# Patient Record
Sex: FEMALE | Race: OTHER | URBAN - METROPOLITAN AREA
[De-identification: names, ages, dates, MRNs, and addresses within clinical notes are randomized per-mention and may not be internally consistent; named-entity substitution may affect disease eponyms.]

---

## 2024-01-02 ENCOUNTER — OFFICE VISIT (OUTPATIENT)
Age: 29
End: 2024-01-02

## 2024-01-02 ENCOUNTER — HOSPITAL ENCOUNTER (OUTPATIENT)
Facility: HOSPITAL | Age: 29
Setting detail: SPECIMEN
Discharge: HOME | End: 2024-01-05

## 2024-01-02 VITALS
SYSTOLIC BLOOD PRESSURE: 106 MMHG | DIASTOLIC BLOOD PRESSURE: 77 MMHG | HEIGHT: 61 IN | TEMPERATURE: 97.7 F | OXYGEN SATURATION: 100 % | HEART RATE: 68 BPM | WEIGHT: 210.1 LBS | BODY MASS INDEX: 39.67 KG/M2

## 2024-01-02 DIAGNOSIS — K21.9 GASTROESOPHAGEAL REFLUX DISEASE WITHOUT ESOPHAGITIS: ICD-10-CM

## 2024-01-02 DIAGNOSIS — R10.11 RUQ PAIN: Primary | ICD-10-CM

## 2024-01-02 PROCEDURE — 99203 OFFICE O/P NEW LOW 30 MIN: CPT | Performed by: FAMILY MEDICINE

## 2024-01-02 PROCEDURE — 87338 HPYLORI STOOL AG IA: CPT

## 2024-01-02 RX ORDER — ONDANSETRON 4 MG/1
4 TABLET, FILM COATED ORAL EVERY 8 HOURS PRN
COMMUNITY

## 2024-01-02 RX ORDER — PANTOPRAZOLE SODIUM 40 MG/1
40 TABLET, DELAYED RELEASE ORAL
Qty: 30 TABLET | Refills: 0 | Status: SHIPPED | OUTPATIENT
Start: 2024-01-02

## 2024-01-02 RX ORDER — DICYCLOMINE HYDROCHLORIDE 10 MG/1
10 CAPSULE ORAL
COMMUNITY

## 2024-01-02 SDOH — ECONOMIC STABILITY: HOUSING INSECURITY
IN THE LAST 12 MONTHS, WAS THERE A TIME WHEN YOU DID NOT HAVE A STEADY PLACE TO SLEEP OR SLEPT IN A SHELTER (INCLUDING NOW)?: YES

## 2024-01-02 SDOH — ECONOMIC STABILITY: INCOME INSECURITY: HOW HARD IS IT FOR YOU TO PAY FOR THE VERY BASICS LIKE FOOD, HOUSING, MEDICAL CARE, AND HEATING?: SOMEWHAT HARD

## 2024-01-02 SDOH — ECONOMIC STABILITY: HOUSING INSECURITY
IN THE LAST 12 MONTHS, WAS THERE A TIME WHEN YOU DID NOT HAVE A STEADY PLACE TO SLEEP OR SLEPT IN A SHELTER (INCLUDING NOW)?: NO

## 2024-01-02 SDOH — ECONOMIC STABILITY: HOUSING INSECURITY: IN THE LAST 12 MONTHS, HOW MANY PLACES HAVE YOU LIVED?: 3

## 2024-01-02 SDOH — ECONOMIC STABILITY: INCOME INSECURITY: IN THE LAST 12 MONTHS, WAS THERE A TIME WHEN YOU WERE NOT ABLE TO PAY THE MORTGAGE OR RENT ON TIME?: YES

## 2024-01-02 SDOH — ECONOMIC STABILITY: FOOD INSECURITY: WITHIN THE PAST 12 MONTHS, THE FOOD YOU BOUGHT JUST DIDN'T LAST AND YOU DIDN'T HAVE MONEY TO GET MORE.: OFTEN TRUE

## 2024-01-02 SDOH — ECONOMIC STABILITY: FOOD INSECURITY: WITHIN THE PAST 12 MONTHS, YOU WORRIED THAT YOUR FOOD WOULD RUN OUT BEFORE YOU GOT MONEY TO BUY MORE.: OFTEN TRUE

## 2024-01-02 SDOH — ECONOMIC STABILITY: TRANSPORTATION INSECURITY
IN THE PAST 12 MONTHS, HAS THE LACK OF TRANSPORTATION KEPT YOU FROM MEDICAL APPOINTMENTS OR FROM GETTING MEDICATIONS?: YES

## 2024-01-02 SDOH — ECONOMIC STABILITY: TRANSPORTATION INSECURITY
IN THE PAST 12 MONTHS, HAS LACK OF TRANSPORTATION KEPT YOU FROM MEETINGS, WORK, OR FROM GETTING THINGS NEEDED FOR DAILY LIVING?: YES

## 2024-01-02 ASSESSMENT — PATIENT HEALTH QUESTIONNAIRE - PHQ9
1. LITTLE INTEREST OR PLEASURE IN DOING THINGS: 0
SUM OF ALL RESPONSES TO PHQ QUESTIONS 1-9: 0
2. FEELING DOWN, DEPRESSED OR HOPELESS: 0
SUM OF ALL RESPONSES TO PHQ9 QUESTIONS 1 & 2: 0
SUM OF ALL RESPONSES TO PHQ QUESTIONS 1-9: 0

## 2024-01-02 ASSESSMENT — ENCOUNTER SYMPTOMS
ABDOMINAL PAIN: 1
DIARRHEA: 1
SHORTNESS OF BREATH: 0
NAUSEA: 1
ABDOMINAL DISTENTION: 1
COUGH: 0
CONSTIPATION: 0

## 2024-01-02 ASSESSMENT — SOCIAL DETERMINANTS OF HEALTH (SDOH)
WITHIN THE LAST YEAR, HAVE YOU BEEN HUMILIATED OR EMOTIONALLY ABUSED IN OTHER WAYS BY YOUR PARTNER OR EX-PARTNER?: NO
WITHIN THE LAST YEAR, HAVE YOU BEEN AFRAID OF YOUR PARTNER OR EX-PARTNER?: NO
WITHIN THE LAST YEAR, HAVE YOU BEEN KICKED, HIT, SLAPPED, OR OTHERWISE PHYSICALLY HURT BY YOUR PARTNER OR EX-PARTNER?: NO
HOW HARD IS IT FOR YOU TO PAY FOR THE VERY BASICS LIKE FOOD, HOUSING, MEDICAL CARE, AND HEATING?: VERY HARD
WITHIN THE LAST YEAR, HAVE TO BEEN RAPED OR FORCED TO HAVE ANY KIND OF SEXUAL ACTIVITY BY YOUR PARTNER OR EX-PARTNER?: NO

## 2024-01-02 NOTE — PROGRESS NOTES
Clarissa Fernandez is a 28 y.o. female   Chief Complaint   Patient presents with    Abdominal Pain     Pt is here for abdominal pain mostly on right side for the last 2 or 3 weeks. ER visit 12/26/23 [facility].          ASSESSMENT AND PLAN:    1. RUQ pain  Gallstones can be missed on CT, will check US.  - US ABDOMEN LIMITED; Future    2. Gastroesophageal reflux disease without esophagitis  I suspect she does have some GERD/gastritis. Will start a PPI after she's given a stool sample for Hpylori.  Follow up after imaging.  - pantoprazole (PROTONIX) 40 MG tablet; Take 1 tablet by mouth every morning (before breakfast)  Dispense: 30 tablet; Refill: 0  - H. Pylori Antigen, Stool; Future        SUBJECTIVE:    HPI:  Clarissa Fernandez is a 28 y.o. female who presents for abdominal pain followup.  Went to  ED one week ago because she was having RUQ pain radiating to her right side.  5 days prior she was having generalized abdominal pain and diarrhea.  For a while, she has felt like she had trouble digesting foods. She will have reflux that lasts >24 hours. She gets a bitter taste in her mouth.  She was having pain, but went to [employer]. At [employer] she fainted, so they took her to the ED.  She had a CT. She was told to see a specialist because she had gastritis.  She has multiple family members who had been told they had gastritis but had cholecystitis/cholelithiasis, so she is skeptical of the diagnosis.  Bentyl and zofran have helped the pain, but she feels dizzy all day.   She has been nervous about eating and only eating once a day - mostly soups and veggies.  Pain is worse after eating.    She used to have mild RUQ pain and occasional diarrhea or cramps, but she was also having chest pressure and chills when she went to the ED. The pain was different.    Review of Systems   Constitutional:  Negative for fatigue, fever and unexpected weight change.   Eyes:  Negative for visual

## 2024-01-03 NOTE — PROGRESS NOTES
[facility]:  [id number].  Talita Carrion LPN    Patient name and date of birth verified by .  Patient given an after visit summary, reviewed medication on how and when to take, coupon given to present to pharmacy for medication discount. Patient was given equipment and instructions on how to collect stool specimen for H. Pylori stool.  Clarissa was given instructions to bring specimen to [facility] as a lab drop off or if she completed the day of visit  could come back to [facility] to drop off specimen.  Patient was given instructions not to start the medication until she submitted the stool specimen.  Patient was able verbalized understanding of the process.   Advised that follow up appointment will be pending upon completion of ultrasound.  Patient verbalized understanding of all information given at time of visit. Talita Carrion LPN    Patient signed a release of information form for emergency room visit at [facility] December 26, 2023.  Request of records will be faxed  faxed.  ADELE Bonilla interpreter [place]:  [id number].  Talita Carrion LPN    Patient was given a call at [phone] and a voice message was left of appointment date, time and location of ultrasound.  Patient is scheduled at [facility] on January 15, 2024 at 3:45 pm with arrival at 3:00 pm.  Patient advised not to eat 6 hours prior to test and only have sips of water.  Imaging center list with address indicated for test and letter with appointment date and time with test instructions.  Talita Carrion LPN

## 2024-01-05 LAB
H PYLORI AG STL QL IA: NEGATIVE
SPECIMEN SOURCE: NORMAL

## 2024-01-15 ENCOUNTER — HOSPITAL ENCOUNTER (OUTPATIENT)
Facility: HOSPITAL | Age: 29
Discharge: HOME | End: 2024-01-18
Attending: FAMILY MEDICINE

## 2024-01-15 DIAGNOSIS — R10.11 RUQ PAIN: ICD-10-CM

## 2024-01-15 PROCEDURE — 76705 ECHO EXAM OF ABDOMEN: CPT

## 2024-01-18 NOTE — RESULT ENCOUNTER NOTE
Please schedule patient for a follow up appointment to review their results.    -----  US:  No gallstones or GB abnormalities.  Possible fatty liver  (normal LFTs and normal CT at )

## 2024-01-30 ENCOUNTER — TELEPHONE (OUTPATIENT)
Age: 29
End: 2024-01-30

## 2024-01-30 NOTE — TELEPHONE ENCOUNTER
OW called patient 2x and was unable to speak with patient. Left a voice message explaining the reason of the call and asking to call OW back.

## 2024-02-02 ENCOUNTER — OFFICE VISIT (OUTPATIENT)
Age: 29
End: 2024-02-02

## 2024-02-02 DIAGNOSIS — Z71.89 COUNSELING AND COORDINATION OF CARE: Primary | ICD-10-CM

## 2024-02-02 NOTE — PROGRESS NOTES
Assisted patient with medical bills. FA was filled out. Patient took it with her. Pending POI and bank statements. Patient will call [facility] when she has pending documents. Patient was instructed to complete Medicaid screening.   Food resources were given to patient, along with food distribution event for tomorrow 2/3/24.

## 2024-02-16 ENCOUNTER — OFFICE VISIT (OUTPATIENT)
Age: 29
End: 2024-02-16

## 2024-02-16 DIAGNOSIS — Z71.89 COUNSELING AND COORDINATION OF CARE: Primary | ICD-10-CM

## 2024-02-16 NOTE — PROGRESS NOTES
Patient's [facility] FA is incomplete. Pending 's POI. A new appointment was made for 2/29/24. Patient was instructed to complete Medicaid screening. Patient has FA application.

## 2024-02-27 ENCOUNTER — TELEPHONE (OUTPATIENT)
Age: 29
End: 2024-02-27

## 2024-03-01 ENCOUNTER — OFFICE VISIT (OUTPATIENT)
Age: 29
End: 2024-03-01

## 2024-03-01 DIAGNOSIS — Z71.89 COUNSELING AND COORDINATION OF CARE: Primary | ICD-10-CM

## 2024-03-01 NOTE — PROGRESS NOTES
Patient has been approved for [employer] insurance. CHW explained that she can no longer be seen at the [facility] and the debt that patient had was written-off after her [employer] health insurance was approved.   Patient still has a pending Radiology bill and CHW provided phone number where patient needs to call and let them know that she has health insurance, so her debt can be written off.  Patient verbalized understanding.

## 2025-07-15 ENCOUNTER — TELEMEDICINE (OUTPATIENT)
Age: 30
End: 2025-07-15
Payer: COMMERCIAL

## 2025-07-15 DIAGNOSIS — R53.83 OTHER FATIGUE: ICD-10-CM

## 2025-07-15 DIAGNOSIS — K59.00 CONSTIPATION, UNSPECIFIED CONSTIPATION TYPE: Primary | ICD-10-CM

## 2025-07-15 PROCEDURE — 99213 OFFICE O/P EST LOW 20 MIN: CPT | Performed by: FAMILY MEDICINE

## 2025-07-15 RX ORDER — POLYETHYLENE GLYCOL 3350 17 G/17G
17 POWDER, FOR SOLUTION ORAL DAILY
Qty: 510 G | Refills: 0 | Status: SHIPPED | OUTPATIENT
Start: 2025-07-15

## 2025-07-15 SDOH — ECONOMIC STABILITY: FOOD INSECURITY: WITHIN THE PAST 12 MONTHS, YOU WORRIED THAT YOUR FOOD WOULD RUN OUT BEFORE YOU GOT MONEY TO BUY MORE.: SOMETIMES TRUE

## 2025-07-15 SDOH — ECONOMIC STABILITY: FOOD INSECURITY: WITHIN THE PAST 12 MONTHS, THE FOOD YOU BOUGHT JUST DIDN'T LAST AND YOU DIDN'T HAVE MONEY TO GET MORE.: NEVER TRUE

## 2025-07-15 ASSESSMENT — PATIENT HEALTH QUESTIONNAIRE - PHQ9
SUM OF ALL RESPONSES TO PHQ QUESTIONS 1-9: 0
SUM OF ALL RESPONSES TO PHQ QUESTIONS 1-9: 0
2. FEELING DOWN, DEPRESSED OR HOPELESS: NOT AT ALL
1. LITTLE INTEREST OR PLEASURE IN DOING THINGS: NOT AT ALL
SUM OF ALL RESPONSES TO PHQ QUESTIONS 1-9: 0
SUM OF ALL RESPONSES TO PHQ QUESTIONS 1-9: 0

## 2025-07-15 NOTE — PROGRESS NOTES
Discharge call made to the pt. The pt verified her name and . Session code- 30783. The medication ordered was reviewed with the pt. The pt confirmed coupon texted to her phone from [employer]. The pt had not been called to schedule her appt the pt was scheduled for tomorrow. Gayla Livingston RN

## 2025-07-15 NOTE — PROGRESS NOTES
Intake call to the pt. Session code- 52027. The pt verified their name and .    Pain in left side of abdomen like a knot or something in there when bending over.     Coordination of Care  1. Have you been to the ER, urgent care clinic since your last visit?  Hospitalized since your last visit? No    2. Have you seen or consulted any other health care providers outside of the [facility] since your last visit?  Include any pap smears or colon screening. No  Does the patient need refills?No    Learning Assessment Complete? no  Depression Screening complete in the past 12 months? yes

## 2025-07-15 NOTE — PROGRESS NOTES
Clarissa Fernandez (: 1995) is a 30 y.o. female, Established patient, Virtual Visit for evaluation of the following chief complaint(s):   Abdominal Pain (Started 3 weeks ago got very bad. Very constipated. Having problems evacuating the stool, and sometimes has noticed blood. ), Fatigue (Got fired from job due to fatigue. ), and Dizziness (Pain behind her left ear. Started 2 weeks ago. )       ASSESSMENT/PLAN:  1. Constipation, unspecified constipation type  Start Miralax and will have her follow up for check up and TSH  2. Other fatigue  Follow up for exam and TSH.    With missed menses will need to check pregnancy test.  Return for follow up F2F next available for constipation any provider (acute or chronic care OK).    SUBJECTIVE/OBJECTIVE:  VV for acute visit.  Had insurance last year but was not able to get anything covered and so it was canceled.  Abdominal Pain:  3 weeks ago started with LLQ pain with worsening constipation.  Has small BM until she is able to have large BM but continues with abdominal pain.  Has tried modifying her diet and drinking more water.  Has gone up to 5 days without any BM.  After having BM develops Lt sided headaches behind Lt ear for about an hour.  1 week of Lt knot at Lt rib, only when lifting something heavy.  Fatigue: 3 weeks of fatigue, sleeps 8 hours but wakes tired.    PMHx  [facility] 2023: available records reviewed. abdominal pain: CT NL, Labs mildly elevated WBC and BG otherwise unrevealing (no TSH done).    Review of Systems   Regular but LMP 2025.    Patient-Reported Vitals  No data recorded     Physical Exam    Clarissa Fernandez, was evaluated through a synchronous (real-time) audio encounter. The patient (or guardian if applicable) is aware that this is a billable service, which includes applicable co-pays. This Virtual Visit was conducted with patient's (and/or legal guardian's) consent. Patient identification was verified, and a

## 2025-07-16 ENCOUNTER — HOSPITAL ENCOUNTER (OUTPATIENT)
Facility: HOSPITAL | Age: 30
Setting detail: SPECIMEN
Discharge: HOME | End: 2025-07-19

## 2025-07-16 ENCOUNTER — OFFICE VISIT (OUTPATIENT)
Age: 30
End: 2025-07-16

## 2025-07-16 VITALS
TEMPERATURE: 98.4 F | SYSTOLIC BLOOD PRESSURE: 109 MMHG | WEIGHT: 203 LBS | DIASTOLIC BLOOD PRESSURE: 72 MMHG | HEIGHT: 61 IN | OXYGEN SATURATION: 97 % | BODY MASS INDEX: 38.33 KG/M2 | HEART RATE: 75 BPM

## 2025-07-16 DIAGNOSIS — R53.83 OTHER FATIGUE: ICD-10-CM

## 2025-07-16 DIAGNOSIS — R10.31 ABDOMINAL PAIN, RLQ: ICD-10-CM

## 2025-07-16 DIAGNOSIS — F45.8 TEETH GRINDING: ICD-10-CM

## 2025-07-16 DIAGNOSIS — R53.83 OTHER FATIGUE: Primary | ICD-10-CM

## 2025-07-16 LAB
BASOPHILS # BLD: 0.02 K/UL (ref 0–0.1)
BASOPHILS NFR BLD: 0.2 % (ref 0–1)
DIFFERENTIAL METHOD BLD: NORMAL
EOSINOPHIL # BLD: 0 K/UL (ref 0–0.4)
EOSINOPHIL NFR BLD: 0 % (ref 0–7)
ERYTHROCYTE [DISTWIDTH] IN BLOOD BY AUTOMATED COUNT: 12.7 % (ref 11.5–14.5)
HCG, PREGNANCY, URINE, POC: NEGATIVE
HCT VFR BLD AUTO: 42.2 % (ref 35–47)
HEMOGLOBIN, POC: 13.5 G/DL
HGB BLD-MCNC: 13.2 G/DL (ref 11.5–16)
IMM GRANULOCYTES # BLD AUTO: 0.03 K/UL (ref 0–0.04)
IMM GRANULOCYTES NFR BLD AUTO: 0.3 % (ref 0–0.5)
LYMPHOCYTES # BLD: 2.52 K/UL (ref 0.8–3.5)
LYMPHOCYTES NFR BLD: 28.6 % (ref 12–49)
MCH RBC QN AUTO: 27.6 PG (ref 26–34)
MCHC RBC AUTO-ENTMCNC: 31.3 G/DL (ref 30–36.5)
MCV RBC AUTO: 88.3 FL (ref 80–99)
MONOCYTES # BLD: 0.49 K/UL (ref 0–1)
MONOCYTES NFR BLD: 5.6 % (ref 5–13)
NEUTS SEG # BLD: 5.76 K/UL (ref 1.8–8)
NEUTS SEG NFR BLD: 65.3 % (ref 32–75)
NRBC # BLD: 0 K/UL (ref 0–0.01)
NRBC BLD-RTO: 0 PER 100 WBC
PLATELET # BLD AUTO: 239 K/UL (ref 150–400)
PMV BLD AUTO: 11.6 FL (ref 8.9–12.9)
RBC # BLD AUTO: 4.78 M/UL (ref 3.8–5.2)
VALID INTERNAL CONTROL, POC: PRESENT
WBC # BLD AUTO: 8.8 K/UL (ref 3.6–11)

## 2025-07-16 PROCEDURE — 81025 URINE PREGNANCY TEST: CPT | Performed by: FAMILY MEDICINE

## 2025-07-16 PROCEDURE — 85025 COMPLETE CBC W/AUTO DIFF WBC: CPT

## 2025-07-16 PROCEDURE — 80053 COMPREHEN METABOLIC PANEL: CPT

## 2025-07-16 PROCEDURE — 85018 HEMOGLOBIN: CPT | Performed by: FAMILY MEDICINE

## 2025-07-16 PROCEDURE — 84443 ASSAY THYROID STIM HORMONE: CPT

## 2025-07-16 PROCEDURE — 84439 ASSAY OF FREE THYROXINE: CPT

## 2025-07-16 PROCEDURE — 99214 OFFICE O/P EST MOD 30 MIN: CPT | Performed by: FAMILY MEDICINE

## 2025-07-16 ASSESSMENT — PATIENT HEALTH QUESTIONNAIRE - PHQ9
SUM OF ALL RESPONSES TO PHQ QUESTIONS 1-9: 1
2. FEELING DOWN, DEPRESSED OR HOPELESS: SEVERAL DAYS
1. LITTLE INTEREST OR PLEASURE IN DOING THINGS: NOT AT ALL
SUM OF ALL RESPONSES TO PHQ QUESTIONS 1-9: 1

## 2025-07-16 NOTE — PROGRESS NOTES
Clarissa Fernandez was seen at discharge. Patient verified their full name and . After visit Summary provided and reviewed with patient. RN advised patient when provider recommends to return for follow-up visit in 4 weeks. RN reviewed the provider's instructions with the patient. Patient verbalized her understanding and denies having any further questions at this time.   Patient was assisted in scheduling her imaging appointment for her CT ordered today.   Patient was informed that these services are NOT free. Care Card process was explained to patient, including that she will likely receive a bill in the mail. Patient was informed that once bill is received that she is to set up an appt with our O.W to start on the financial assistance application that is based on the patient's income.  Mental health resource list requested by patient because she is worried her teenage daughter may be struggling with substance abuse. Mental health resources provided and reviewed.   Due to language barrier, an  assisted during this encounter.   RON KAISER RN

## 2025-07-16 NOTE — PROGRESS NOTES
Results for orders placed or performed in visit on 07/16/25   AMB POC HEMOGLOBIN (HGB)   Result Value Ref Range    Hemoglobin, POC 13.5 G/DL   AMB POC URINE PREGNANCY TEST, VISUAL COLOR COMPARISON   Result Value Ref Range    Valid Internal Control, POC present     HCG, Pregnancy, Urine, POC Negative

## 2025-07-16 NOTE — PROGRESS NOTES
Clarissa Fernandez is a 30 y.o. female   Chief Complaint   Patient presents with    Constipation     Patient c/o difficulty w/ BM and after pushing to move bowels she gets a headache.    Fatigue     Patient c/o feeling tired and weak x3 weeks         ASSESSMENT AND PLAN:    1. Other fatigue  - Urine pregnancy negative, hgb wnl  - If labs unremarkable as a cause of symptoms, consider discussing mood disorders. Patient mentioned she has a teenage child that is going through a difficult time.  - AMB POC HEMOGLOBIN (HGB)  - AMB POC URINE PREGNANCY TEST, VISUAL COLOR COMPARISON  - TSH; Future  - T4, Free; Future  - CBC with Auto Differential; Future  - Comprehensive Metabolic Panel; Future    2. Abdominal pain, RLQ  - Patient nontoxic appearing today. Advised patient to go to the ED if she develops fever, worsening pain or nausea/vomiting.  - CT ABDOMEN PELVIS W IV CONTRAST Additional Contrast? None; Future      SUBJECTIVE:    HPI:  Clarissa Fernandez is a 30 y.o. female who presents with difficulty passing BM over the last 3 weeks. Having to strain so hard it causes a headache.   LMP 6/5/25    Saw Dr. Shen yesterday. Started on miralax. Advised to come in for pregnancy test and check TSH.  Urine pregnancy test negative.  Having pain in the LLQ and RLQ.  Sits a long time in the bathroom.   Only able to pass a small amount despite trying a lot  Had a more regular BM this morning after taking miralax last night  Also has a pain between her ribs on the left side. Feels like something is rolling on itsself and causes difficulty for her to breathe  Has fatigue and feels tired despite sleeping 8 hours or more  Wakes up feeling like she is filing her teeth. Has jaw pain. She jerks awake. When she does notice this, its like she is going to fall into a hole.   Feels hot and cold all the time  No fevers or chills  No urinary symptoms    Review of Systems  Negative except as noted in the HPI    /72 (BP

## 2025-07-17 LAB
ALBUMIN SERPL-MCNC: 4.1 G/DL (ref 3.5–5)
ALBUMIN/GLOB SERPL: 1.2 (ref 1.1–2.2)
ALP SERPL-CCNC: 56 U/L (ref 45–117)
ALT SERPL-CCNC: 26 U/L (ref 12–78)
ANION GAP SERPL CALC-SCNC: 8 MMOL/L (ref 2–12)
AST SERPL-CCNC: 11 U/L (ref 15–37)
BILIRUB SERPL-MCNC: 0.4 MG/DL (ref 0.2–1)
BUN SERPL-MCNC: 12 MG/DL (ref 6–20)
BUN/CREAT SERPL: 15 (ref 12–20)
CALCIUM SERPL-MCNC: 9.1 MG/DL (ref 8.5–10.1)
CHLORIDE SERPL-SCNC: 104 MMOL/L (ref 97–108)
CO2 SERPL-SCNC: 24 MMOL/L (ref 21–32)
CREAT SERPL-MCNC: 0.79 MG/DL (ref 0.55–1.02)
GLOBULIN SER CALC-MCNC: 3.3 G/DL (ref 2–4)
GLUCOSE SERPL-MCNC: 94 MG/DL (ref 65–100)
POTASSIUM SERPL-SCNC: 3.8 MMOL/L (ref 3.5–5.1)
PROT SERPL-MCNC: 7.4 G/DL (ref 6.4–8.2)
SODIUM SERPL-SCNC: 136 MMOL/L (ref 136–145)
T4 FREE SERPL-MCNC: 0.8 NG/DL (ref 0.8–1.5)
TSH SERPL DL<=0.05 MIU/L-ACNC: 4.67 UIU/ML (ref 0.36–3.74)

## 2025-07-25 ENCOUNTER — HOSPITAL ENCOUNTER (OUTPATIENT)
Facility: HOSPITAL | Age: 30
Discharge: HOME | End: 2025-07-28
Attending: FAMILY MEDICINE

## 2025-07-25 DIAGNOSIS — R10.31 ABDOMINAL PAIN, RLQ: ICD-10-CM

## 2025-07-25 PROCEDURE — 74177 CT ABD & PELVIS W/CONTRAST: CPT

## 2025-07-25 PROCEDURE — 6360000004 HC RX CONTRAST MEDICATION: Performed by: FAMILY MEDICINE

## 2025-07-25 RX ORDER — IOPAMIDOL 755 MG/ML
100 INJECTION, SOLUTION INTRAVASCULAR
Status: COMPLETED | OUTPATIENT
Start: 2025-07-25 | End: 2025-07-25

## 2025-07-25 RX ADMIN — IOPAMIDOL 100 ML: 755 INJECTION, SOLUTION INTRAVENOUS at 08:24

## 2025-07-25 NOTE — CONSULTS
Session ID: [id number]  Session Duration: 13 minutes  Language: Vincentian   ID: #[id number]   Name: Zakiya

## 2025-08-21 ENCOUNTER — RESULTS FOLLOW-UP (OUTPATIENT)
Age: 30
End: 2025-08-21

## 2025-08-25 ENCOUNTER — OFFICE VISIT (OUTPATIENT)
Age: 30
End: 2025-08-25

## 2025-08-25 VITALS
HEART RATE: 71 BPM | BODY MASS INDEX: 38.9 KG/M2 | OXYGEN SATURATION: 98 % | SYSTOLIC BLOOD PRESSURE: 121 MMHG | TEMPERATURE: 98.2 F | WEIGHT: 203.4 LBS | DIASTOLIC BLOOD PRESSURE: 84 MMHG

## 2025-08-25 DIAGNOSIS — K59.00 CONSTIPATION, UNSPECIFIED CONSTIPATION TYPE: ICD-10-CM

## 2025-08-25 DIAGNOSIS — E03.9 HYPOTHYROIDISM, ACQUIRED: Primary | ICD-10-CM

## 2025-08-25 DIAGNOSIS — Z3A.12 12 WEEKS GESTATION OF PREGNANCY: ICD-10-CM

## 2025-08-25 DIAGNOSIS — Z13.9 ENCOUNTER FOR SCREENING: ICD-10-CM

## 2025-08-25 DIAGNOSIS — K21.9 GASTROESOPHAGEAL REFLUX DISEASE, UNSPECIFIED WHETHER ESOPHAGITIS PRESENT: ICD-10-CM

## 2025-08-25 DIAGNOSIS — K64.4 EXTERNAL HEMORRHOID: ICD-10-CM

## 2025-08-25 LAB
HCG, PREGNANCY, URINE, POC: POSITIVE
VALID INTERNAL CONTROL, POC: YES

## 2025-08-25 PROCEDURE — 99213 OFFICE O/P EST LOW 20 MIN: CPT | Performed by: FAMILY MEDICINE

## 2025-08-25 PROCEDURE — 81025 URINE PREGNANCY TEST: CPT | Performed by: FAMILY MEDICINE

## 2025-08-25 SDOH — ECONOMIC STABILITY: FOOD INSECURITY: WITHIN THE PAST 12 MONTHS, THE FOOD YOU BOUGHT JUST DIDN'T LAST AND YOU DIDN'T HAVE MONEY TO GET MORE.: NEVER TRUE

## 2025-08-25 SDOH — ECONOMIC STABILITY: FOOD INSECURITY: WITHIN THE PAST 12 MONTHS, YOU WORRIED THAT YOUR FOOD WOULD RUN OUT BEFORE YOU GOT MONEY TO BUY MORE.: SOMETIMES TRUE

## 2025-08-25 ASSESSMENT — PATIENT HEALTH QUESTIONNAIRE - PHQ9
SUM OF ALL RESPONSES TO PHQ QUESTIONS 1-9: 0
SUM OF ALL RESPONSES TO PHQ QUESTIONS 1-9: 0
1. LITTLE INTEREST OR PLEASURE IN DOING THINGS: NOT AT ALL
SUM OF ALL RESPONSES TO PHQ QUESTIONS 1-9: 0
SUM OF ALL RESPONSES TO PHQ QUESTIONS 1-9: 0
2. FEELING DOWN, DEPRESSED OR HOPELESS: NOT AT ALL

## 2025-08-25 ASSESSMENT — LIFESTYLE VARIABLES
HOW OFTEN DO YOU HAVE A DRINK CONTAINING ALCOHOL: NEVER
HOW MANY STANDARD DRINKS CONTAINING ALCOHOL DO YOU HAVE ON A TYPICAL DAY: PATIENT DOES NOT DRINK
